# Patient Record
Sex: FEMALE | Race: WHITE
[De-identification: names, ages, dates, MRNs, and addresses within clinical notes are randomized per-mention and may not be internally consistent; named-entity substitution may affect disease eponyms.]

---

## 2018-10-19 ENCOUNTER — HOSPITAL ENCOUNTER (EMERGENCY)
Dept: HOSPITAL 25 - UCEAST | Age: 58
Discharge: HOME | End: 2018-10-19
Payer: COMMERCIAL

## 2018-10-19 VITALS — SYSTOLIC BLOOD PRESSURE: 148 MMHG | DIASTOLIC BLOOD PRESSURE: 98 MMHG

## 2018-10-19 DIAGNOSIS — M79.671: Primary | ICD-10-CM

## 2018-10-19 PROCEDURE — G0463 HOSPITAL OUTPT CLINIC VISIT: HCPCS

## 2018-10-19 PROCEDURE — 99212 OFFICE O/P EST SF 10 MIN: CPT

## 2018-10-19 NOTE — UC
Erythromycin Pregnancy And Lactation Text: This medication is Pregnancy Category B and is considered safe during pregnancy. It is also excreted in breast milk. Lower Extremity/Ankle HPI





- HPI Summary


HPI Summary: 


This patient is a 58 year old F presenting to [facility] with a chief complaint of R 

foot pain since 10/15/18.


Patient reports she tripped over a chair leg. She reports the bruise that is 

there is fading but it is painful to ambulate. The CC is described as not 

improving since onset. The patient rates the pain 8/10 in severity. Symptoms 

aggravated by ambulation. Symptoms alleviated by nothing.





- History of Current Complaint


Chief Complaint: UCLowerExtremity


Stated Complaint: FOOT INJURY


Time Seen by Provider: 10/19/18 19:33


Hx Obtained From: Patient


Hx Last Menstrual Period: 


Onset/Duration: Sudden Onset, Lasting Days, Still Present


Severity Initially: Severe


Severity Currently: Severe


Pain Intensity: 8


Pain Scale Used: 0-10 Numeric


Aggravating Factor(s): Ambulation


Alleviating Factor(s): Nothing


Able to Bear Weight: Yes - able to ambulate but with pain





- Allergies/Home Medications


Allergies/Adverse Reactions: 


 Allergies











Allergy/AdvReac Type Severity Reaction Status Date / Time


 


brown coating on Advil Allergy  Eyes Uncoded 10/19/18 19:31





   Itchy/Swollen/Red/Watery  











Home Medications: 


 Home Medications





NK [No Home Medications Reported]  10/19/18 [History Confirmed 10/19/18]











PMH/Surg Hx/FS Hx/Imm Hx


Endocrine History: Other


Other Endocrine History: No DM


Cancer History: Other


Other Cancer History: No breast CA





- Surgical History


Surgical History: None





- Family History


Known Family History: 


   Negative: Cardiac Disease, Hypertension, Diabetes





- Social History


Alcohol Use: Occasionally


Substance Use Type: None


Smoking Status (MU): Never Smoked Tobacco





Review of Systems


Skin: Bruising


Musculoskeletal: Other: - R foot pain


All Other Systems Reviewed And Are Negative: Yes





Physical Exam





- Summary


Physical Exam Summary: 


VITAL SIGNS: Reviewed.


GENERAL: Patient is a well-developed and nourished FEMALE who is lying 

comfortable in the stretcher. Patient is not in any acute respiratory distress.


HEAD AND FACE: Normocephalic


EYES: PERRLA, EOMI x 2.


EARS: Hearing grossly intact.


MOUTH: Oropharynx within normal limits.


NECK: Supple, trachea is midline, no adenopathy, no JVD, no carotid bruit.


CHEST: Symmetric, no tenderness at palpation


LUNGS: Clear to auscultation bilaterally. No wheezing or crackles.


CVS: Regular rate and rhythm, S1 and S2 present, no murmurs or gallops 

appreciated.


ABDOMEN: Soft, non-tender. Bowel sounds are normal. No abdominal abnormal 

pulsations.


EXTREMITIES: Full ROM in all major joints, no edema, no cyanosis or clubbing. 

Bruising in 2nd toe of the R foot. The patient is ambulating.


NEURO: Alert and oriented x 3. No acute neurological deficits. Speech is normal 

and follows commands.


SKIN: Dry and warm. Bruising in 2nd toe of the R foot.


Triage Information Reviewed: Yes


Vital Signs: 


 Initial Vital Signs











Temp  99.6 F   10/19/18 19:21


 


Pulse  85   10/19/18 19:21


 


Resp  16   10/19/18 19:21


 


BP  148/98   10/19/18 19:21


 


Pulse Ox  99   10/19/18 19:21











Vital Signs Reviewed: Yes





Diagnostics





- Radiology


  ** R foot XR


Radiology Interpretation Completed By: ED Physician - No acute fracture. 

Pending radiologist official interpretation.





Lower Extremity Course/Dx





- Course


Course Of Treatment: X-ray of the right foot impression: No acute fracture 

dislocation.  The patient is ambulating with minimal discomfort.  Patient was 

discharged home with follow-up with PCP.  She was instructed that the x-ray 

will be reviewed by radiology and if there is any changes she will be call 

back.  She will be taking ibuprofen or Tylenol for the pain.  She is 

hemodynamically stable alert oriented 3.





- Differential Dx/Diagnosis


Provider Diagnoses: foot pain





Discharge





- Sign-Out/Discharge


Documenting (check all that apply): Patient Departure


All imaging exams completed and their final reports reviewed: Yes





- Discharge Plan


Condition: Stable


Disposition: HOME


Patient Education Materials:  Arthralgia (ED)


Referrals: 


Nahomy Beach MD [Primary Care Provider] - 


Additional Instructions: 





Take Acetaminophen or ibuprofen for pain or fever


Increase your fluid intake


Return to the  or go to the emergency department if symptoms worsen


Follow-up with primary care physician in next 2-3 days





FOLLOW UP WITH YOUR PRIMARY CARE PROVIDER WITHIN ONE WEEK FOR HIGH BLOOD 

PRESSURE NOTED TODAY.





- Billing Disposition and Condition


Condition: STABLE


Disposition: Home





- Attestation Statements


Document Initiated by Scribe: Yes


Documenting Scribe: Fritz Guzman


Provider For Whom Scribe is Documenting (Include Credential): Dg Lutz MD


Scribe Attestation: 


Fritz MONACO, scribed for Dg Lutz MD on 10/19/18 at 2040. 


Scribe Documentation Reviewed: Yes


Provider Attestation: 


The documentation as recorded by the scribe, Fritz Guzman accurately reflects the 

service I personally performed and the decisions made by me, Dg Lutz MD Doxycycline Pregnancy And Lactation Text: This medication is Pregnancy Category D and not consider safe during pregnancy. It is also excreted in breast milk but is considered safe for shorter treatment courses. Topical Retinoid Pregnancy And Lactation Text: This medication is Pregnancy Category C. It is unknown if this medication is excreted in breast milk. Tetracycline Counseling: Patient counseled regarding possible photosensitivity and increased risk for sunburn. Patient instructed to avoid sunlight, if possible. When exposed to sunlight, patients should wear protective clothing, sunglasses, and sunscreen. The patient was instructed to call the office immediately if the following severe adverse effects occur:  hearing changes, easy bruising/bleeding, severe headache, or vision changes. The patient verbalized understanding of the proper use and possible adverse effects of tetracycline. All of the patient's questions and concerns were addressed. Patient understands to avoid pregnancy while on therapy due to potential birth defects. Topical Retinoid counseling:  Patient advised to apply a pea-sized amount only at bedtime and wait 30 minutes after washing their face before applying. If too drying, patient may add a non-comedogenic moisturizer. The patient verbalized understanding of the proper use and possible adverse effects of retinoids. All of the patient's questions and concerns were addressed. Use Enhanced Medication Counseling?: No High Dose Vitamin A Pregnancy And Lactation Text: High dose vitamin A therapy is contraindicated during pregnancy and breast feeding. Dapsone Counseling: I discussed with the patient the risks of dapsone including but not limited to hemolytic anemia, agranulocytosis, rashes, methemoglobinemia, kidney failure, peripheral neuropathy, headaches, GI upset, and liver toxicity. Patients who start dapsone require monitoring including baseline LFTs and weekly CBCs for the first month, then every month thereafter. The patient verbalized understanding of the proper use and possible adverse effects of dapsone. All of the patient's questions and concerns were addressed. Benzoyl Peroxide Pregnancy And Lactation Text: This medication is Pregnancy Category C. It is unknown if benzoyl peroxide is excreted in breast milk. Spironolactone Counseling: Patient advised regarding risks of diarrhea, abdominal pain, hyperkalemia, birth defects (for female patients), liver toxicity and renal toxicity. The patient may need blood work to monitor liver and kidney function and potassium levels while on therapy. The patient verbalized understanding of the proper use and possible adverse effects of spironolactone. All of the patient's questions and concerns were addressed. Isotretinoin Pregnancy And Lactation Text: This medication is Pregnancy Category X and is considered extremely dangerous during pregnancy. It is unknown if it is excreted in breast milk. Azithromycin Pregnancy And Lactation Text: This medication is considered safe during pregnancy and is also secreted in breast milk. Tazorac Pregnancy And Lactation Text: This medication is not safe during pregnancy. It is unknown if this medication is excreted in breast milk. Birth Control Pills Counseling: Birth Control Pill Counseling: I discussed with the patient the potential side effects of OCPs including but not limited to increased risk of stroke, heart attack, thrombophlebitis, deep venous thrombosis, hepatic adenomas, breast changes, GI upset, headaches, and depression. The patient verbalized understanding of the proper use and possible adverse effects of OCPs. All of the patient's questions and concerns were addressed. Topical Clindamycin Counseling: Patient counseled that this medication may cause skin irritation or allergic reactions. In the event of skin irritation, the patient was advised to reduce the amount of the drug applied or use it less frequently. The patient verbalized understanding of the proper use and possible adverse effects of clindamycin. All of the patient's questions and concerns were addressed. Isotretinoin Counseling: Patient should get monthly blood tests, not donate blood, not drive at night if vision affected, not share medication, and not undergo elective surgery for 6 months after tx completed. Side effects reviewed, pt to contact office should one occur. Topical Sulfur Applications Counseling: Topical Sulfur Counseling: Patient counseled that this medication may cause skin irritation or allergic reactions. In the event of skin irritation, the patient was advised to reduce the amount of the drug applied or use it less frequently. The patient verbalized understanding of the proper use and possible adverse effects of topical sulfur application. All of the patient's questions and concerns were addressed. Tazorac Counseling:  Patient advised that medication is irritating and drying. Patient may need to apply sparingly and wash off after an hour before eventually leaving it on overnight. The patient verbalized understanding of the proper use and possible adverse effects of tazorac. All of the patient's questions and concerns were addressed. Minocycline Pregnancy And Lactation Text: This medication is Pregnancy Category D and not consider safe during pregnancy. It is also excreted in breast milk. Detail Level: Detailed High Dose Vitamin A Counseling: Side effects reviewed, pt to contact office should one occur. Topical Sulfur Applications Pregnancy And Lactation Text: This medication is Pregnancy Category C and has an unknown safety profile during pregnancy. It is unknown if this topical medication is excreted in breast milk. Bactrim Counseling:  I discussed with the patient the risks of sulfa antibiotics including but not limited to GI upset, allergic reaction, drug rash, diarrhea, dizziness, photosensitivity, and yeast infections. Rarely, more serious reactions can occur including but not limited to aplastic anemia, agranulocytosis, methemoglobinemia, blood dyscrasias, liver or kidney failure, lung infiltrates or desquamative/blistering drug rashes. Birth Control Pills Pregnancy And Lactation Text: This medication should be avoided if pregnant and for the first 30 days post-partum. Azithromycin Counseling:  I discussed with the patient the risks of azithromycin including but not limited to GI upset, allergic reaction, drug rash, diarrhea, and yeast infections. Dapsone Pregnancy And Lactation Text: This medication is Pregnancy Category C and is not considered safe during pregnancy or breast feeding. Doxycycline Counseling:  Patient counseled regarding possible photosensitivity and increased risk for sunburn. Patient instructed to avoid sunlight, if possible. When exposed to sunlight, patients should wear protective clothing, sunglasses, and sunscreen. The patient was instructed to call the office immediately if the following severe adverse effects occur:  hearing changes, easy bruising/bleeding, severe headache, or vision changes. The patient verbalized understanding of the proper use and possible adverse effects of doxycycline. All of the patient's questions and concerns were addressed. Spironolactone Pregnancy And Lactation Text: This medication can cause feminization of the male fetus and should be avoided during pregnancy. The active metabolite is also found in breast milk. Bactrim Pregnancy And Lactation Text: This medication is Pregnancy Category D and is known to cause fetal risk. It is also excreted in breast milk. Erythromycin Counseling:  I discussed with the patient the risks of erythromycin including but not limited to GI upset, allergic reaction, drug rash, diarrhea, increase in liver enzymes, and yeast infections. Benzoyl Peroxide Counseling: Patient counseled that medicine may cause skin irritation and bleach clothing. In the event of skin irritation, the patient was advised to reduce the amount of the drug applied or use it less frequently. The patient verbalized understanding of the proper use and possible adverse effects of benzoyl peroxide. All of the patient's questions and concerns were addressed. Topical Clindamycin Pregnancy And Lactation Text: This medication is Pregnancy Category B and is considered safe during pregnancy. It is unknown if it is excreted in breast milk. Minocycline Counseling: Patient advised regarding possible photosensitivity and discoloration of the teeth, skin, lips, tongue and gums. Patient instructed to avoid sunlight, if possible. When exposed to sunlight, patients should wear protective clothing, sunglasses, and sunscreen. The patient was instructed to call the office immediately if the following severe adverse effects occur:  hearing changes, easy bruising/bleeding, severe headache, or vision changes. The patient verbalized understanding of the proper use and possible adverse effects of minocycline. All of the patient's questions and concerns were addressed.

## 2018-10-20 NOTE — RAD
HISTORY: foot pain, trauma



COMPARISONS: None



VIEWS: 3 , Frontal, lateral, and oblique views of the right foot 



FINDINGS:



BONE DENSITY: Normal.

BONES: There is no displaced fracture. There are calcaneal enthesophytes. 

JOINTS: There is moderate to advanced osteoarthritis of the first MTP joint and midfoot. 

ALIGNMENT: There is hallux valgus. 

SOFT TISSUES: Unremarkable.



OTHER FINDINGS: None.



IMPRESSION: 

OSTEOARTHRITIS. NO ACUTE OSSEOUS INJURY. IF SYMPTOMS PERSIST, RECOMMEND REPEAT IMAGING.



R0

## 2019-04-16 NOTE — HP
HISTORY AND PHYSICAL:

 

DATE OF ADMISSION/SURGERY:  04/23/19

 

DATE OF OFFICE VISIT:  04/15/19

 

SURGEON:  Judy Spencer MD* (dictated by JEAN Marshall).

 

PROCEDURE:  Right total knee arthroplasty.

 

CHIEF COMPLAINT:  Right knee pain.

 

HISTORY OF PRESENT ILLNESS:  Ms. Guzman is a 58-year-old female with end-stage 
osteoarthritis of the right knee.  She has failed conservative treatment and 
elected to proceed with a right total knee arthroplasty.

 

PAST MEDICAL HISTORY:  Denies.

 

PAST SURGICAL HISTORY:  Denies.

 

MEDICATIONS:  Vitamin D3.

 

ALLERGIES:  No known drug allergies.

 

FAMILY HISTORY:  Hypertension.

 

SOCIAL HISTORY:  A 58-year-old, lives with her spouse.  Does not smoke or use 
drugs or alcohol.

 

REVIEW OF SYSTEMS:  A complete 14-point review of systems was reviewed with the 
patient.  It was all negative and noncontributory.  She denies a history of DVT
, PE, hepatitis, HIV, or anesthesia problems.

 

                               PHYSICAL EXAMINATION

 

GENERAL:  She is well developed, well nourished, in no acute distress.

 

VITAL SIGNS:  She stands 64 inches tall, weighs 183 pounds. Blood pressure is 
118/78, heart rate is 68.

 

HEENT:  Normocephalic, atraumatic.

 

NECK:  Supple, no palpable lymph nodes.

 

PULMONARY:  Lungs are clear to auscultation bilaterally.

 

CARDIAC:  Regular rate and rhythm.  Strong S1, S2.

 

ABDOMEN:  Soft, nontender, nondistended.

 

NEUROLOGICAL:  She is alert and oriented x3.

 

MUSCULOSKELETAL:  Right lower extremity, the skin is intact.  There are no open 
wounds or abrasions.  Some tenderness over the medial and lateral joint lines. 
There is some moderate joint effusion.  Range of motion is 15 to 120 degrees of 
flexion with patellofemoral crepitus.  She has 2+ dorsalis pedis pulse.  She is 
able to dorsiflex and plantarflex and has intact sensation.

 

 ASSESSMENT AND PLAN:  Ms. Guzman is a 58-year-old female with end-stage 
osteoarthritis of the right knee.  She has failed conservative treatment and 
elected to proceed with a right total knee arthroplasty.  The surgery is 
scheduled for 04/23/19 with Dr. Spencer.  Dr. Spencer discussed the risks and 
benefits of the surgery at today's visit and all of her questions were 
answered.  She will follow up with Dr. Spencer 2 weeks after the surgery.

 

 ____________________________________ 

JEAN MARSHALL

 

[id number]/CPS #: [id number]

[facility]

## 2019-04-23 ENCOUNTER — HOSPITAL ENCOUNTER (OUTPATIENT)
Dept: HOSPITAL 25 - OR | Age: 59
Setting detail: OBSERVATION
LOS: 1 days | Discharge: HOME | End: 2019-04-24
Attending: ORTHOPAEDIC SURGERY | Admitting: ORTHOPAEDIC SURGERY
Payer: COMMERCIAL

## 2019-04-23 DIAGNOSIS — M17.11: Primary | ICD-10-CM

## 2019-04-23 PROCEDURE — 85049 AUTOMATED PLATELET COUNT: CPT

## 2019-04-23 PROCEDURE — 85018 HEMOGLOBIN: CPT

## 2019-04-23 PROCEDURE — 80048 BASIC METABOLIC PNL TOTAL CA: CPT

## 2019-04-23 PROCEDURE — 88305 TISSUE EXAM BY PATHOLOGIST: CPT

## 2019-04-23 PROCEDURE — 88311 DECALCIFY TISSUE: CPT

## 2019-04-23 PROCEDURE — G0378 HOSPITAL OBSERVATION PER HR: HCPCS

## 2019-04-23 PROCEDURE — C1776 JOINT DEVICE (IMPLANTABLE): HCPCS

## 2019-04-23 PROCEDURE — 96375 TX/PRO/DX INJ NEW DRUG ADDON: CPT

## 2019-04-23 PROCEDURE — 85014 HEMATOCRIT: CPT

## 2019-04-23 PROCEDURE — 96374 THER/PROPH/DIAG INJ IV PUSH: CPT

## 2019-04-23 PROCEDURE — 36415 COLL VENOUS BLD VENIPUNCTURE: CPT

## 2019-04-23 RX ADMIN — MAGNESIUM HYDROXIDE SCH ML: 400 SUSPENSION ORAL at 21:05

## 2019-04-23 RX ADMIN — SODIUM CHLORIDE, SODIUM LACTATE, POTASSIUM CHLORIDE, AND CALCIUM CHLORIDE SCH MLS/HR: 600; 310; 30; 20 INJECTION, SOLUTION INTRAVENOUS at 14:41

## 2019-04-23 RX ADMIN — OXYCODONE HYDROCHLORIDE PRN MG: 5 CAPSULE ORAL at 18:12

## 2019-04-23 RX ADMIN — ACETAMINOPHEN SCH: 325 TABLET ORAL at 22:52

## 2019-04-23 RX ADMIN — CYCLOBENZAPRINE HYDROCHLORIDE PRN MG: 10 TABLET, FILM COATED ORAL at 21:00

## 2019-04-23 RX ADMIN — DOCUSATE SODIUM SCH MG: 100 CAPSULE, LIQUID FILLED ORAL at 21:04

## 2019-04-23 RX ADMIN — OXYCODONE HYDROCHLORIDE AND ACETAMINOPHEN PRN TAB: 5; 325 TABLET ORAL at 20:59

## 2019-04-23 RX ADMIN — DOCUSATE SODIUM SCH: 100 CAPSULE, LIQUID FILLED ORAL at 21:07

## 2019-04-23 RX ADMIN — CEFAZOLIN SCH MLS/HR: 1 INJECTION, POWDER, FOR SOLUTION INTRAVENOUS at 17:25

## 2019-04-23 RX ADMIN — OXYCODONE HYDROCHLORIDE PRN MG: 5 CAPSULE ORAL at 14:06

## 2019-04-23 NOTE — PN
Progress Note





- Progress Note


Date of Service: 04/23/19


Note: 





patient resting comfortably with no complaints; able to wiggle toes/plantar flex

, 2_+ DP pulse and intact sensation, dressing c/d/i

## 2019-04-23 NOTE — OP
Operative Report - Blank





- Operative Report


Date of Operation: 04/23/19


Note: 





DENNIS HOLLINGSWORTH


1960





Date of Surgery: 4/23/19





Judy Spencer MD





Assistant: Megan PENA did help throughout the procedure with preparation 

of the knee, wound retraction, manipulation of the knee, and wound closure.  





Anesthesiologist: Peña CHI





Anesthesia Type: Spinal





Preoperative Diagnosis: Right severe degenerative osteoarthritis of the knee





Postoperative Diagnosis: As above





Procedure Performed: Right Total Knee Arthroplasty





Tourniquet time: 44 minutes





Complications: None





Specimen: Bone and cartilage from the right knee joint sent to pathology.  





Hardware Used: Cemented [employer]hew total knee hardware was used - For the 

femur a size 6 narrow oxinium legion posterior stabilized femoral component, 

for the tibia a size 5 right larissa II tibial baseplate, for the insert a size 

9 mm 5-6 posterior stabilized articular polyethylene insert, and for the 

patella a size 32 3-peg all poly patella.  





Brief History/Indication: DENNIS HOLLINGSWORTH was known in clinic and had a history of 

severe right knee pain and swelling. She failed conservative treatment with anti

-inflammatories, pain pills, intra-articular injections and physical therapy.  

She elected to undergo right total knee arthroplasty due to continued pain and 

decreased quality of life.  Radiographs showed severe end stage osteoarthritis 

of the knee with bone on bone contact.  Informed consent was obtained from the 

patient.  She understood the risks of surgery included but were not limited to: 

bleeding, infection, damage to nearby structures, intraoperative fracture, 

nerve palsy, failure of the hardware, early loosening, knee stiffness or loss 

of motion, anesthesia complications, stroke, heart attack, blood clot and 

death.  She wished to proceed.





Intra-Operative Findings: Intraoperatively the patient was noted to have severe 

loss of cartilage in all 3 compartments of the knee.  





Description of the Procedure: 





DENNIS HOLLINGSWORTH was identified in the preanesthesia unit. Her right knee was marked 

as the correct operative side.  Informed consent was signed and placed in the 

chart. The patient was taken to the operating room and placed under anesthesia 

without complication. A aguirre catheter was placed. A tourniquet was placed on 

the right thigh. The right lower extremity was prepped and draped in the usual 

sterile fashion. Preoperative time-out was made to correctly identify the 

patient, side and site. Appropriate intraoperative antibiotics were given 

within one hour of incision.





Tourniquet was inflated. A midline incision was made and carried sharply down 

to the extensor mechanism. A new 10 blade was used to make a standard medial 

parapatellar arthrotomy. The patella was subluxed laterally. Electrocautery was 

used to dissect soft tissue off the superomedial tibia to the midsagittal 

plane.  The knee was flexed up. The anterior horn of the lateral meniscus and 

the ACL were sharply incised. A drill was used to enter the distal femur. The 

intramedullary distal femoral cutting guide was pinned on the distal femur. The 

oscillating saw was used to make the distal femoral cut.





The external rotation guide was pinned on the distal femur and the distal femur 

was sized to a size 6. The size 6 multi-cutting jig was pinned on the distal 

femur. The oscillating saw was used to make the appropriate 4 chamfer cuts.  

Next the PCL was completely released.  The extramedullary tibial cutting guide 

was pinned on the proximal tibia and the oscillating saw was used to make the 

proximal tibial cut perpendicular to the mechanical axis of the tibia. The bone 

was carefully removed.





The knee was brought out into full extension. The spacer block was placed and 

had excellent fit with the knee in full extension. The medial and lateral 

ligaments were well balanced. The flexion and extension gaps were well 

balanced. The knee was flexed up. Lamina  was placed both medially and 

laterally. Any remaining meniscus was removed with electrocautery.  Curved 

osteotome was used to remove any posterior osteophytes. The tibial tray and 

drop radha were placed and confirmed a satisfactory tibial cut.





The size 6 right narrow femoral trial was impacted onto the distal femur. This 

trial had excellent fit and stability. The box for the posterior stabilized 

implant was prepared using a box cut osteotome and a reamer. Next a tibial tray 

trial and 9 mm insert trial was placed. The knee was taken through a range of 

motion and had full extension to 130 degrees of flexion. Patellofemoral 

tracking was satisfactory.





The patella was inverted and sized to a size 32. Three peg holes were drilled 

through the size 32 drill guide. The trial patella was placed and the knee was 

taken through a range of motion. There was satisfactory patellofemoral 

tracking. All trials were removed. The tibia was subluxed anteriorly and sized 

to a size 5. The proximal tibial was prepared with a size 5 keel punch.  All 

bony cut surfaces were irrigated with sterile saline and dried. Final implants 

were cemented into place starting with the tibia, followed by the femur, and 

last the patella. A 9 mm insert trial was placed and the knee was brought into 

full extension.





Tourniquet was turned down and the knee was copiously irrigated with sterile 

saline. Electrocautery was used to obtain meticulous hemostasis. Once the 

cement had fully cured, the insert trial was removed. Any excess cement was 

removed from around the hardware and capsule.  Final insert chosen was a 9 mm 

posterior stabilized Larissa II articular insert size 5-6. Stability of the 

insert was checked and noted to be stable.





The extensor mechanism was closed using number 1 vicryls. The rest of the 

incision was closed in a layered fashion using 0 and 2-0 vicryls. The skin was 

closed using 3-0 nylon suture. Sterile xeroform, 4x4s and webril were used to 

cover the incision.  Ace wrap and cold pack were used to cover the dressings. 

The patients anesthesia was reversed without difficulty. She was taken to the 

PACU in stable condition.  Intended weight-bearing will be as tolerated.

## 2019-04-24 VITALS — SYSTOLIC BLOOD PRESSURE: 153 MMHG | DIASTOLIC BLOOD PRESSURE: 78 MMHG

## 2019-04-24 LAB
ANION GAP SERPL CALC-SCNC: 4 MMOL/L (ref 2–11)
BUN SERPL-MCNC: 8 MG/DL (ref 6–24)
BUN/CREAT SERPL: 12.1 (ref 8–20)
CALCIUM SERPL-MCNC: 9 MG/DL (ref 8.6–10.3)
CHLORIDE SERPL-SCNC: 104 MMOL/L (ref 101–111)
GLUCOSE SERPL-MCNC: 135 MG/DL (ref 70–100)
HCO3 SERPL-SCNC: 30 MMOL/L (ref 22–32)
HCT VFR BLD AUTO: 40 % (ref 33–41)
HGB BLD-MCNC: 13.7 G/DL (ref 12–16)
PLATELET # BLD AUTO: 186 10^3/UL (ref 150–450)
POTASSIUM SERPL-SCNC: 3.8 MMOL/L (ref 3.5–5)
SODIUM SERPL-SCNC: 138 MMOL/L (ref 135–145)

## 2019-04-24 RX ADMIN — ACETAMINOPHEN SCH: 325 TABLET ORAL at 06:36

## 2019-04-24 RX ADMIN — OXYCODONE HYDROCHLORIDE PRN MG: 5 CAPSULE ORAL at 14:49

## 2019-04-24 RX ADMIN — OXYCODONE HYDROCHLORIDE AND ACETAMINOPHEN PRN TAB: 5; 325 TABLET ORAL at 04:08

## 2019-04-24 RX ADMIN — OXYCODONE HYDROCHLORIDE PRN MG: 5 CAPSULE ORAL at 00:24

## 2019-04-24 RX ADMIN — ACETAMINOPHEN SCH: 325 TABLET ORAL at 13:25

## 2019-04-24 RX ADMIN — DOCUSATE SODIUM SCH: 100 CAPSULE, LIQUID FILLED ORAL at 08:42

## 2019-04-24 RX ADMIN — OXYCODONE HYDROCHLORIDE AND ACETAMINOPHEN PRN TAB: 5; 325 TABLET ORAL at 11:23

## 2019-04-24 RX ADMIN — OXYCODONE HYDROCHLORIDE PRN MG: 5 CAPSULE ORAL at 07:27

## 2019-04-24 RX ADMIN — SODIUM CHLORIDE, SODIUM LACTATE, POTASSIUM CHLORIDE, AND CALCIUM CHLORIDE SCH MLS/HR: 600; 310; 30; 20 INJECTION, SOLUTION INTRAVENOUS at 00:25

## 2019-04-24 RX ADMIN — MAGNESIUM HYDROXIDE SCH ML: 400 SUSPENSION ORAL at 08:40

## 2019-04-24 RX ADMIN — CEFAZOLIN SCH MLS/HR: 1 INJECTION, POWDER, FOR SOLUTION INTRAVENOUS at 00:28

## 2019-04-24 RX ADMIN — CEFAZOLIN SCH MLS/HR: 1 INJECTION, POWDER, FOR SOLUTION INTRAVENOUS at 07:31

## 2019-04-24 RX ADMIN — CYCLOBENZAPRINE HYDROCHLORIDE PRN MG: 10 TABLET, FILM COATED ORAL at 04:09

## 2019-04-24 NOTE — PN
Progress Note





- Progress Note


Date of Service: 04/24/19


SOAP: 


Subjective:


[]Pt seen and examined at bedside. She feels very well and desires DC home. 

Denies CP, SOB, dizziness, nausea.








Objective:


[]Gereral: Well appearing, NAD


RLE: Right knee dressing changed, incision CDI without erythema or discharge, 

thigh is soft, DP2+, DF/PF intact


Calves supple and nontender without erythema, edema or palpable cords 








Assessment:


[]POD 1 sp RTK








Plan:


[]WBAT


PT/OT


eliquis 2.5 mg po BID for 30 days post op


Anticipate DC home today as long as patient meets goals with PT





 Vital Signs











Temp  98.9 F   04/24/19 07:20


 


Pulse  76   04/24/19 07:20


 


Resp  18   04/24/19 08:00


 


BP  117/73   04/24/19 07:20


 


Pulse Ox  98   04/24/19 08:00








 Intake & Output











 04/23/19 04/24/19 04/24/19





 18:59 06:59 18:59


 


Intake Total 2655 2010 320


 


Output Total 2000 2100 


 


Balance 655 -90 320


 


Intake:   


 


  IV Fluids 2200 1060 


 


    ABX - CEFAZOLIN  110 


 


    LR 2100 950 


 


    NS 100ML, Cefazolin 2G 100  


 


  IVPB 55  


 


    ABX - CEFAZOLIN 55  


 


  Oral 400 950 320


 


Output:   


 


  Paz 2000 2100 


 


Other:   


 


  # Bowel Movements  0 








 Laboratory Last Values











Hgb  13.7 g/dL (12.0-16.0)   04/24/19  06:27    


 


Hct  40 % (33-41)   04/24/19  06:27    


 


Plt Count  186 10^3/uL (150-450)   04/24/19  06:27    


 


MPV  8.4 fL (7.4-10.4)   04/24/19  06:27    


 


Sodium  138 mmol/L (135-145)   04/24/19  06:27    


 


Potassium  3.8 mmol/L (3.5-5.0)   04/24/19  06:27    


 


Chloride  104 mmol/L (101-111)   04/24/19  06:27    


 


Carbon Dioxide  30 mmol/L (22-32)   04/24/19  06:27    


 


Anion Gap  4 mmol/L (2-11)   04/24/19  06:27    


 


BUN  8 mg/dL (6-24)   04/24/19  06:27    


 


Creatinine  0.66 mg/dL (0.51-0.95)   04/24/19  06:27    


 


Est GFR ( Amer)  112.1  (>60)   04/24/19  06:27    


 


Est GFR (Non-Af Amer)  92.6  (>60)   04/24/19  06:27    


 


BUN/Creatinine Ratio  12.1  (8-20)   04/24/19  06:27    


 


Glucose  135 mg/dL ()  H  04/24/19  06:27    


 


Calcium  9.0 mg/dL (8.6-10.3)   04/24/19  06:27

## 2019-04-24 NOTE — DS
Orthopedic Discharge Summary





- Discharge Summary





Date of Admission:19


Date of Discharge: 19


Date of Surgery: 19


Attending Orthopedic Provider: Dr Spencer


Pre-operative Diagnosis: right knee osteoarthritis


Operative Procedure: right total knee arthroplasty


Condition of Patient: stable


History: DENNIS HOLLINGSWORTH is a 58 year old F with years of increasingly severe right 

knee pain. Patient has failed conservative management and has elected to 

undergo a right total knee replacement


Hospital Course: DENNIS was admitted to [facility] on 19. 

Patient underwent a right total knee replacement without complication followed 

by a brief recovery in PACU and transfer to the Short Stay Surgical Unit in 

stable condition. Our hospitalist service, physical therapy and occupational 

therapy also participated in this patients care. Post-op day 1: patient was 

alert and in no acute distress. Dressing and incision was clean, dry and 

intact. Operative extremity dorsiflexion and plantarflexion intact, sensation 

intact to light touch distally, DP2+. Physical therapy goals were met. Stable 

for DC home.





 Discharge Medications











 Medication  Instructions  Recorded  Confirmed  Type


 


Acetaminophen TAB* [Tylenol TAB*] 975 mg PO Q8HR  tab 19  Rx


 


Apixaban* [Eliquis*] 2.5 mg PO BID #60 tab 19  Rx


 


Docusate CAP* [Colace Cap*] 100 mg PO BID #90 cap 19  Rx


 


oxyCODONE/Acetamin 5/325 MG* 2 tab PO Q4H PRN #70 tab MDD 10 19  Rx





[Percocet 5/325 TAB*]    








Discharge to home in stable condition 19





Discharge Instructions following Orthopedic Surgery:





Activity:  


* Weight Bearing as tolerated


* Continue physical therapy and occupational therapy exercises as shown


* start outpatient PT





Wound care: 


* OK to shower on post-op day 3, no bathing, swimming, or submerging wound. 


* Use gentle soap, pat dry. Cover with gauze, ACE wrap or tape.





Call Orthopedic office for: 


* Increased drainage


* Redness


* Increased pain


* Fever 





***Go to ER with shortness of breath or chest pain.***





Diet: 


* Regular diet


* Increase fluids and fiber to prevent constipation. 


* Continue to use stool softeners, call office if no bowel motion within 48 

hours.








Medications


See Home Medication List in your packet for medications that you should take 

after discharge.





DVT Prophylaxis:





   Eliquis Dosin.5 mg, 1 tab every 12 hours x 30 days. This medication 

increases bleeding tendency








Pain Control:





   Percocet Dosin/325 mg 1-2 tabs by mouth every 4-6 hours as needed for 

pain. Maximum of 10 tabs per day.  Wean off as soon as pain allows. hold for 

sedation





**Please note that Percocet contains Tylenol (acetaminophen). Maximum daily 

dose of Tylenol is 4000 mg from all sources.**





Antibiotics are required prior to any dental work.








FOLLOW UP: Follow up with  [Tj] Within 10-14 days, call for appointment





Please call our office with any questions or concerns ([phone])

## 2020-04-20 ENCOUNTER — HOSPITAL ENCOUNTER (OUTPATIENT)
Dept: HOSPITAL 25 - ED | Age: 60
Setting detail: OBSERVATION
LOS: 1 days | Discharge: HOME | End: 2020-04-21
Attending: SURGERY | Admitting: SURGERY
Payer: COMMERCIAL

## 2020-04-20 ENCOUNTER — HOSPITAL ENCOUNTER (EMERGENCY)
Dept: HOSPITAL 25 - UCEAST | Age: 60
Discharge: HOME HEALTH | End: 2020-04-20
Payer: COMMERCIAL

## 2020-04-20 VITALS — SYSTOLIC BLOOD PRESSURE: 150 MMHG | DIASTOLIC BLOOD PRESSURE: 88 MMHG

## 2020-04-20 DIAGNOSIS — F32.9: ICD-10-CM

## 2020-04-20 DIAGNOSIS — Z88.6: ICD-10-CM

## 2020-04-20 DIAGNOSIS — R11.0: ICD-10-CM

## 2020-04-20 DIAGNOSIS — R14.0: ICD-10-CM

## 2020-04-20 DIAGNOSIS — K35.80: Primary | ICD-10-CM

## 2020-04-20 DIAGNOSIS — Z88.8: ICD-10-CM

## 2020-04-20 DIAGNOSIS — Z96.651: ICD-10-CM

## 2020-04-20 DIAGNOSIS — Z79.899: ICD-10-CM

## 2020-04-20 DIAGNOSIS — R10.31: Primary | ICD-10-CM

## 2020-04-20 LAB
ALBUMIN SERPL BCG-MCNC: 4.1 G/DL (ref 3.2–5.2)
ALBUMIN/GLOB SERPL: 1.4 {RATIO} (ref 1–3)
ALP SERPL-CCNC: 82 U/L (ref 34–104)
ALT SERPL W P-5'-P-CCNC: 20 U/L (ref 7–52)
ANION GAP SERPL CALC-SCNC: 6 MMOL/L (ref 2–11)
AST SERPL-CCNC: 13 U/L (ref 13–39)
BASOPHILS # BLD AUTO: 0 10^3/UL (ref 0–0.2)
BUN SERPL-MCNC: 9 MG/DL (ref 6–24)
BUN/CREAT SERPL: 14.3 (ref 8–20)
CALCIUM SERPL-MCNC: 8.8 MG/DL (ref 8.6–10.3)
CHLORIDE SERPL-SCNC: 105 MMOL/L (ref 101–111)
EOSINOPHIL # BLD AUTO: 0 10^3/UL (ref 0–0.6)
GLOBULIN SER CALC-MCNC: 2.9 G/DL (ref 2–4)
GLUCOSE SERPL-MCNC: 126 MG/DL (ref 70–100)
HCO3 SERPL-SCNC: 26 MMOL/L (ref 22–32)
HCT VFR BLD AUTO: 43 % (ref 35–47)
HGB BLD-MCNC: 14.6 G/DL (ref 12–16)
LYMPHOCYTES # BLD AUTO: 0.6 10^3/UL (ref 1–4.8)
MCH RBC QN AUTO: 31 PG (ref 27–31)
MCHC RBC AUTO-ENTMCNC: 34 G/DL (ref 31–36)
MCV RBC AUTO: 91 FL (ref 80–97)
MONOCYTES # BLD AUTO: 0.3 10^3/UL (ref 0–0.8)
NEUTROPHILS # BLD AUTO: 11.1 10^3/UL (ref 1.5–7.7)
NRBC # BLD AUTO: 0 10^3/UL
NRBC BLD QL AUTO: 0
PLATELET # BLD AUTO: 218 10^3/UL (ref 150–450)
POTASSIUM SERPL-SCNC: 3.4 MMOL/L (ref 3.5–5)
PROT SERPL-MCNC: 7 G/DL (ref 6.4–8.9)
RBC # BLD AUTO: 4.69 10^6 /UL (ref 3.7–4.87)
SODIUM SERPL-SCNC: 137 MMOL/L (ref 135–145)
WBC # BLD AUTO: 12 10^3/UL (ref 3.5–10.8)

## 2020-04-20 PROCEDURE — C1776 JOINT DEVICE (IMPLANTABLE): HCPCS

## 2020-04-20 PROCEDURE — 99284 EMERGENCY DEPT VISIT MOD MDM: CPT

## 2020-04-20 PROCEDURE — 96365 THER/PROPH/DIAG IV INF INIT: CPT

## 2020-04-20 PROCEDURE — 96366 THER/PROPH/DIAG IV INF ADDON: CPT

## 2020-04-20 PROCEDURE — 96375 TX/PRO/DX INJ NEW DRUG ADDON: CPT

## 2020-04-20 PROCEDURE — G0463 HOSPITAL OUTPT CLINIC VISIT: HCPCS

## 2020-04-20 PROCEDURE — 74177 CT ABD & PELVIS W/CONTRAST: CPT

## 2020-04-20 PROCEDURE — 99211 OFF/OP EST MAY X REQ PHY/QHP: CPT

## 2020-04-20 PROCEDURE — 81003 URINALYSIS AUTO W/O SCOPE: CPT

## 2020-04-20 PROCEDURE — 85025 COMPLETE CBC W/AUTO DIFF WBC: CPT

## 2020-04-20 PROCEDURE — 87086 URINE CULTURE/COLONY COUNT: CPT

## 2020-04-20 PROCEDURE — 80053 COMPREHEN METABOLIC PANEL: CPT

## 2020-04-20 PROCEDURE — 76830 TRANSVAGINAL US NON-OB: CPT

## 2020-04-20 PROCEDURE — 36415 COLL VENOUS BLD VENIPUNCTURE: CPT

## 2020-04-20 PROCEDURE — 88304 TISSUE EXAM BY PATHOLOGIST: CPT

## 2020-04-20 PROCEDURE — G0378 HOSPITAL OBSERVATION PER HR: HCPCS

## 2020-04-20 PROCEDURE — 87077 CULTURE AEROBIC IDENTIFY: CPT

## 2020-04-20 PROCEDURE — 86140 C-REACTIVE PROTEIN: CPT

## 2020-04-20 PROCEDURE — 83690 ASSAY OF LIPASE: CPT

## 2020-04-20 NOTE — XMS REPORT
Continuity of Care Document (CCD)

 Created on:2020



Patient:Isabella Guzman

Sex:Female

:1960

External Reference #:[id number]





Demographics







 Address  [address] [place] [address]

 

 Mobile Phone  [phone]

 

 Email Address  [email or web address]

 

 Preferred Language  en

 

 Marital Status  Not  or 

 

 Jew Affiliation  Unknown

 

 Race  White

 

 Ethnic Group  Not  or 









Author







 Name  Judy Spencer M.D.

 

 Address  [address] [place] [address] [address]



   [place] [address]









Care Team Providers







 Name  Role  Phone

 

 Nahomy Espinosa MD - Family Medicine  Care Team Information   [phone]
[phone]









Problems







 Active Problems  Provider  Date

 

 Localized, primary osteoarthritis  Judy Spencer M.D.  Onset: 2019

 

 Acquired genu valgum  Judy Spencer M.D.  Onset: 2019

 

 History of total knee arthroplasty    Onset: 2019

 

 Postmenopausal bleeding    Onset: 2018









 Note: LMP 12/15, 4 days of VB , had negative endometrial biopsy Dr Burkett 
.



 No bleeding since.









 Genetic counseling    Onset: 2018

 

 Gluten sensitivity    Onset: 2018

 

 Obesity    Onset: 2017

 

 Metabolic syndrome X    Onset: 2017

 

 Seasonal affective disorder    Onset: 2017

 

 Hereditary essential tremor    Onset: 2017

 

 Vitamin D deficiency    Onset: 2016

 

 Hyperlipidemia    Onset: 2016

 

 Osteoarthritis of knee    Onset: 2016

 

 Generalized anxiety disorder    Onset: 2013

 

 Perimenopausal disorder    Onset: 2011

 

 Postpartum depression    Onset: 2001

 

 Closed fracture of patella  Judy Spencer M.D.  Onset: 2020







Social History







 Type  Date  Description  Comments

 

 Birth Sex    Unknown  

 

 Tobacco Use  Start: Unknown  Never Smoked Cigarettes  

 

 Smoking Status  Reviewed: 20  Never Smoked Cigarettes  

 

 ETOH Use    Rarely consumes alcohol  

 

 Tobacco Use  Start: Unknown  Patient has never smoked  

 

 Exercise Type/Frequency    Exercises regularly  

 

 Guns in Home    No  







Allergies, Adverse Reactions, Alerts







 Description

 

 No Known Drug Allergies







Medications







 Active Medications  SIG  Qnty  Indications  Ordering Provider  Date

 

 Amoxicillin  take 4 pills, 2 g  4caps  Z47.1  Judy Spencer,  2019



           500mg  1 hour before      M.D.  



 Capsules  dental or gi        



   procedure        

 

 Vitamin D3  one every daily      Unknown  



          5000Unit          



 Capsules          



           

 

 Tylenol  2 by mouth every 6      Unknown  



       325mg Tablets  hours as needed        



   pain        

 

 Aleve  as directed      Unknown  



     220mg Tablets          



           

 

 Naproxen Sodium  prn for joint      Unknown  



               220mg  pain, takes it        



 Tablets  rarely        



           







Medications Administered in Office







 Medication  SIG  Qnty  Indications  Ordering Provider  Date

 

 DTaP,Unspecified        Unknown  10/01/2006



   Injection          







Immunizations







 CPT Code  Status  Date  Vaccine  Lot #

 

 [id number]  Given  10/01/2019  Influenza Virus Vaccine, Quadrivalent, Split,  



       Preservative Free  

 

 [id number]  Given  2017  Influenza Virus Vaccine, Quadrivalent, Split, Im Use
  



       6-35mo  

 

 [id number]  Given  2017  Tdap - Tetanus/Diptheria/Acellular Pertussis  

 

 [id number]  Given  10/01/2015  Flu Vaccine  







Vital Signs







 Date  Vital  Result  Comment

 

 2020 10:26am  Height  64 inches  5'4"









 Weight  195.00 lb  

 

 Heart Rate  87 /min  

 

 BP Systolic  150 mmHg  

 

 BP Diastolic  85 mmHg  

 

 Body Temperature  98.3 F  

 

 O2 % BldC Oximetry  98 %  

 

 BMI (Body Mass Index)  33.5 kg/m2  









 2020  9:54am  Height  64 inches  5'4"









 Weight  190.00 lb  

 

 Heart Rate  120 /min  

 

 BP Systolic  120 mmHg  

 

 BP Diastolic  81 mmHg  

 

 Body Temperature  97.9 F  

 

 Pain Level  0  

 

 BMI (Body Mass Index)  32.6 kg/m2  







Results







 Test  Acquired Date  Facility  Test  Result  H/L  Range  Note

 

 Basic Metabolic  2020  [facility]  Sodium  140 mmol/L  Normal
  135-145  1



 Panel    [address] [place] [address] [phone]          









 Potassium  4.1 mmol/L  Normal  3.5-5.0  

 

 Chloride  106 mmol/L  Normal  101-111  

 

 Co2 Carbon Dioxide  29 mmol/L  Normal  22-32  

 

 Anion Gap  5 mmol/L  Normal  2-11  

 

 Glucose  106 mg/dL  High    

 

 Blood Urea Nitrogen  11 mg/dL  Normal  6-24  

 

 Creatinine  0.69 mg/dL  Normal  0.51-0.95  

 

 BUN/Creatinine Ratio  15.9  Normal  8-20  

 

 Calcium  8.9 mg/dL  Normal  8.6-10.3  

 

 Egfr Non-  87.7    >60  

 

 Egfr   106.1    >60  2









 Lipid Profile  2020  [facility]  Triglycerides  134 mg/dL    
  3



 (Trig/Chol/HDL)    [address]          



     [place] [address] [phone]          









 Cholesterol  138 mg/dL      4

 

 HDL Cholesterol  38.0 mg/dL      5

 

 LDL Cholesterol  73 mg/dL      6









 Laboratory test  2020  [facility]  Hemoglobin A1c  5.7 %  
High  4.0-5.6  7



 finding    [address]  (Glyco HGB)        



     [place] [address] [phone]          









 1  FASTING

 

 2  *******Because ethnic data is not always readily available,



   this report includes an eGFR for both -Americans and



   non- Americans.****



   The National Kidney Disease Education Program (NKDEP) does



   not endorse the use of the MDRD equation for patients that



   are not between the ages of 18 and 70, are pregnant, have



   extremes of body size, muscle mass, or nutritional status,



   or are non- or non-.



   According to the National Kidney Foundation, irrespective of



   diagnosis, the stage of the disease is based on the level of



   kidney function:



   Stage Description                      GFR(mL/min/1.73 m(2))



   1     Kidney damage with normal or decreased GFR       90



   2     Kidney damage with mild decrease in GFR          60-89



   3     Moderate decrease in GFR                         30-59



   4     Severe decrease in GFR                           15-29



   5     Kidney failure                       <15 (or dialysis)

 

 3  Desirable: <150



   Borderline High: 150-199



   High: 200-499



   Very High: >500

 

 4  Desirable: <200



   Borderline High: 200-239



   High: >239

 

 5  Low: <40



   Desirable: 40-60



   High: >60

 

 6  Desirable: <100



   Near Optimal: 100-129



   Borderline High: 130-159



   High: 160-189



   Very High: >189

 

 7  Therapeutic target for the treatment of diabetes



   mellitus patients is <7% HBA1C, and in selective



   patients <6.0%.  Please refer to American Diabetes



   Association diabetic care guidelines for further



   information.







Procedures







 Date  Code  Description  Status

 

 2019  95443484  Mammogram  Completed

 

 2013  36505984  Colonoscopy  Completed







Medical Devices







 Description

 

 No Information Available







Encounters







 Type  Date  Location  Provider  Dx  Diagnosis

 

 Office Visit  2020  [facility]  Judy Spencer,  M25.562  Pain in 
left knee



   8:15a  at [place]  M.D.    









 S82.002D  Unsp fracture of left patella, subs for clos fx w routn heal

 

 W01.0xxD  Fall same lev from slip/trip w/o strike against object, subs

 

 M25.462  Effusion, left knee

 

 M17.12  Unilateral primary osteoarthritis, left knee









 Office Visit  2020 10:30a  [facility] [place]  Nahomy Espinosa MD  Z00.00  
Encntr for



     [facility]      general adult



           medical exam w/o



           abnormal



           findings









 M17.12  Unilateral primary osteoarthritis, left knee

 

 Z96.651  Presence of right artificial knee joint

 

 R73.01  Impaired fasting glucose

 

 F32.9  Major depressive disorder, single episode, unspecified









 Office Visit  2020  9:45a  [facility]  Judy Spencer,  S82.002D  
Unsp fracture



     at [place]  M.D.    of left



           patella, subs



           for clos fx w



           routn heal









 W01.0xxD  Fall same lev from slip/trip w/o strike against object, subs

 

 M25.562  Pain in left knee

 

 M25.462  Effusion, left knee

 

 M17.12  Unilateral primary osteoarthritis, left knee

 

 S80.02xD  Contusion of left knee, subsequent encounter









 Office Visit  2019  8:15a  [facility]  Judy Spencer,  M25.562  
Pain in left



     at [place]  M.D.    knee









 M25.462  Effusion, left knee

 

 M25.562  Pain in left knee

 

 S80.02xA  Contusion of left knee, initial encounter

 

 W01.0xxA  Fall same lev from slip/trip w/o strike against object, init

 

 M25.462  Effusion, left knee

 

 M17.12  Unilateral primary osteoarthritis, left knee

 

 M17.12  Unilateral primary osteoarthritis, left knee

 

 M21.062  Valgus deformity, not elsewhere classified, left knee

 

 S82.002A  Unsp fracture of left patella, init for clos fx

 

 W19.xxxA  Unspecified fall, initial encounter







Assessments







 Date  Code  Description  Provider

 

 2020  M25.562  Pain in left knee  Judy Spencer M.D.

 

 2020  S82.002D  Unspecified fracture of left patella,  Judy Spencer M.D.



     subsequent encounter for closed fracture with  



     routine healing  

 

 2020  W01.0xxD  Fall on same level from slipping, tripping and  Judy Spencer M.D.



     stumbling without subsequent striking against  



     object, subsequent encounter  

 

 2020  M25.462  Effusion, left knee  Judy Spencer M.D.

 

 2020  M17.12  Unilateral primary osteoarthritis, left knee  Judy Spencer M.D.

 

 2020  Z96.651  Presence of right artificial knee joint  Judy Spencer M.D.

 

 2020  Z00.00  Adult health examination  Nahomy Espinosa MD

 

 2020  M17.12  Unilateral primary osteoarthritis, left knee  Nahomy Espinosa MD

 

 2020  Z96.651  Presence of right artificial knee joint  Nahomy Espinosa MD

 

 2020  R73.01  Impaired fasting glycaemia  Nahomy Espinosa MD

 

 2020  F32.9  Reactive depression (situational)  Nahomy Espinosa MD

 

 2020  S82.002D  Unspecified fracture of left patella,  Judy Spencer M.D.



     subsequent encounter for closed fracture with  



     routine healing  

 

 2020  W01.0xxD  Fall on same level from slipping, tripping and  Judy Spencer M.D.



     stumbling without subsequent striking against  



     object, subsequent encounter  

 

 2020  M25.562  Pain in left knee  Judy Spencer M.D.

 

 2020  M25.462  Effusion, left knee  Judy Spencer M.D.

 

 2020  M17.12  Unilateral primary osteoarthritis, left knee  Judy Spencer M.D.

 

 2020  S80.02xD  Contusion of left knee, subsequent encounter  Judy Spencer M.D.

 

 2019  M25.562  Pain in left knee  Judy Spencer M.D.

 

 2019  M25.462  Effusion, left knee  Judy Spencer M.D.

 

 2019  M25.562  Pain in left knee  Judy Spencer M.D.

 

 2019  S80.02xA  Contusion of left knee, initial encounter  Judy Spencer M.D.

 

 2019  W01.0xxA  Fall on same level from slipping, tripping and  Judy Spencer M.D.



     stumbling without subsequent striking against  



     object, initial encounter  

 

 2019  M25.462  Effusion, left knee  Judy Spencer M.D.

 

 2019  M17.12  Unilateral primary osteoarthritis, left knee  Judy Spencer M.D.

 

 2019  M17.12  Unilateral primary osteoarthritis, left knee  Judy Spencer M.D.

 

 2019  M21.062  Valgus deformity, not elsewhere classified,  Judy Spencer M.D.



     left knee  

 

 2019  S82.002A  Unspecified fracture of left patella, initial  Judy Spencer M.D.



     encounter for closed fracture  

 

 2019  W19.xxxA  Unspecified fall, initial encounter  Judy Spencer M.D.







Plan of Treatment

Future Appointment(s):2020  8:30 am - Nahomy Espinosa MD at [facility]2020 - Judy Spencer M.D.M25.562 Pain in left kneeS82.002D 
Unspecified fracture of left patella, subsequent encounter for closed fracture 
with routinehealingFollow up:Follow up:   6 weeks for xray left jeplmosC12.0xxD 
Fall on same level from slipping, tripping and stumbling without subsequent 
striking against object, subsequent huaqqmapyX77.462 Effusion, left kneeM17.12 
Unilateral primary osteoarthritis, left kneeM25.562 Pain in left kneeS82.002D 
Unspecified fracture of left patella, subsequent encounter for closed fracture 
with routinehealingFollow up:Follow up:   6 weeks for xray left gyajgvmO77.0xxD 
Fall on same level from slipping, tripping and stumbling without subsequent 
striking against object, subsequent puhmysdcpN66.462 Effusion, left kneeM17.12 
Unilateral primary osteoarthritis, left knee



Functional Status







 Description

 

 No Information Available







Mental Status







 Description

 

 No Information Available







Referrals







 Description

 

 No Information Available

## 2020-04-20 NOTE — ED
GI/ HPI





- HPI Summary


HPI Summary: 


59 year old female presents with abd pain today.  States it started last night. 

She denies any nausea vomiting.  She admits to decreased appetite.  Pain is 

worse with certain movements.  She denies any urinary symptoms.  she admits to 

constipation but took a laxative this morning and had three BM with no change 

in pain.  No diarrhea.  She has had no previous abdominal surgeries.  No 

medical conditions.  took some Tylenol this morning.  





- History of Current Complaint


Chief Complaint: EDAbdPain


Time Seen by Provider: 04/20/20 20:03


Stated Complaint: ABD PAIN PER PT


Hx Last Menstrual Period: postmenapausal


Pain Intensity: 8





- Additional Pertinent History


Primary Care Physician: DIANA





- Allergy/Home Medications


Allergies/Adverse Reactions: 


 Allergies











Allergy/AdvReac Type Severity Reaction Status Date / Time


 


ibuprofen [From Advil] Allergy Intermediate Eyes Verified 04/20/20 18:40





   Itchy/Swollen/Red/Watery  











Home Medications: 


 Home Medications





Acetaminophen TAB* [Tylenol TAB*] 975 mg PO Q8HR  tab 04/24/19 [Rx Confirmed 04/ 20/20]


Docusate CAP* [Colace Cap*] 100 mg PO BID #90 cap 04/24/19 [Rx Confirmed 04/20/ 20]


Melatonin [Melatonin Maximum Strengt] 10 mg PO DAILY 04/20/20 [History 

Confirmed 04/20/20]











PMH/Surg Hx/FS Hx/Imm Hx


Endocrine/Hematology History: 


   Denies: Hx Anticoagulant Therapy


Respiratory History: 


   Denies: Hx Asthma


Musculoskeletal History: Reports: Hx Arthritis - osteoarthritis bilat knees


   Denies: Hx Rheumatoid Arthritis, Hx Osteoporosis


Sensory History: 


   Denies: Hx Contacts or Glasses, Hx Hearing Aid


Opthamlomology History: 


   Denies: Hx Contacts or Glasses


Psychiatric History: Reports: Hx Depression - 20 years ago





- Cancer History


Hx Chemotherapy: No


Hx Radiation Therapy: No





- Surgical History


Surgery Procedure, Year, and Place: Right Knee replacement


Infectious Disease History: No


Infectious Disease History: 


   Denies: Traveled Outside the [place] in Last 30 Days





- Family History


Known Family History: 


   Negative: Cardiac Disease, Hypertension, Diabetes





- Social History


Alcohol Use: Weekly


Substance Use Type: Reports: None


Smoking Status (MU): Never Smoked Tobacco





Review of Systems


Negative: Fever


Negative: Chest Pain


Negative: Shortness Of Breath


Positive: Abdominal Pain.  Negative: Vomiting, Diarrhea, Nausea


All Other Systems Reviewed And Are Negative: Yes





Physical Exam


Triage Information Reviewed: Yes


Vital Signs On Initial Exam: 


 Initial Vitals











Temp Pulse Resp BP Pulse Ox


 


 98.7 F   126   19   167/115   97 


 


 04/20/20 18:36  04/20/20 18:36  04/20/20 18:36  04/20/20 18:36  04/20/20 18:36











Vital Signs Reviewed: Yes


Appearance: Positive: Well-Appearing


Skin: Positive: Warm, Dry


Head/Face: Positive: Normal Head/Face Inspection


Eyes: Positive: Normal, Conjunctiva Clear


ENT: Positive: Pharynx normal


Respiratory/Lung Sounds: Positive: Clear to Auscultation, Breath Sounds Present


Cardiovascular: Positive: Normal, RRR


Abdomen Description: Positive: Soft, Other: - tenderness in RLQ


Bowel Sounds: Positive: Present


Musculoskeletal: Positive: Normal


Neurological: Positive: Normal


Psychiatric: Positive: Normal





Procedures





- Sedation


Patient Received Moderate/Deep Sedation with Procedure: No





Diagnostics





- Vital Signs


 Vital Signs











  Temp Pulse Resp BP Pulse Ox


 


 04/20/20 18:36  98.7 F  126  19  167/115  97














- Laboratory


Result Diagrams: 


 04/20/20 20:40





 04/20/20 20:40


Lab Statement: Any lab studies that have been ordered have been reviewed, and 

results considered in the medical decision making process.





- CT


  ** abd


CT Interpretation Completed By: Radiologist


Summary of CT Findings: 1. Appendicitis. There is a rounded fluid collection at 

the appendix/cecal junction which may reflect focally dilated appendix or 

possibly a small periappendiceal abscess measuring 14 mm. 2. Mild fatty 

infiltration of the liver. 3. Colonic diverticulosis without diverticulitis.





- Ultrasound


  ** No standard instances


Ultrasound Interpretation Completed By: Radiologist


Summary of Ultrasound Findings: IMPRESSION: 1. Small posterior fibroid 

measuring 10 x 10 x 7 mm. 2. Otherwise negative pelvic sonogram.





Re-Evaluation





- Re-Evaluation


  ** First Eval


Re-Evaluation Time: 21:34


Comment: pain still present





  ** Second Eval


Re-Evaluation Time: 23:10


Comment: pain improved





GIGU Course/Dx





- Course


Course Of Treatment: 59 year old female presents with abd pain today.  States 

it started last night. She denies any nausea vomiting.  She admits to decreased 

appetite.  Pain is worse with certain movements.  She denies any urinary 

symptoms.  she admits to constipation but took a laxative this morning and had 

three BM with no change in pain.  No diarrhea.  She has had no previous 

abdominal surgeries.  No medical conditions.  took some Tylenol this morning.  

On exam tenderness in the right lower quadrant. wbc 12. crp elevated. 

transvaginal ultrasound shows fibriod. CT abd shows appendicitis. started on 

zosyn. discussed with dr heredia who agrees to admit.





- Diagnoses


Differential Diagnoses - Female: Appendicitis, Gastroenteritis (Viral), Ovarian 

Cyst


Provider Diagnoses: 


 Appendicitis








- Critical Care Time


Critical Care Statement: Critical care time is provided exclusive of any time 

spent performing procedures.





Discharge ED





- Sign-Out/Discharge


Documenting (check all that apply): Patient Departure





- Discharge Plan


Condition: Stable


Disposition: ADMITTED TO [facility]





- Billing Disposition and Condition


Condition: STABLE


Disposition: Admitted to [facility]

## 2020-04-20 NOTE — UC
UC General HPI





- HPI Summary


HPI Summary: 





Reviewed RN triage - Pt awoke with severe abdominal pain in lower right quadrant

, that is still 6 or 7. Nausea during the night, but not vomiting. Also has 

bloating in abdomen. Has appendix.





60 yo female c/o abd pain since apprx 1am today. 


Pain woke her from sleep.  


Worse b/n 1-4am, better now but still hurts a lot. 


No n/v/d. 


Has felt constipated over the last month. 


Had bm x 4 today, no blood / melena (does have hx hemorrhoids). 


No urinary sx. 


No rash. 


No fever / chills. 


No sob /cp / palpitations. 


No back pain. 


No hx gi / gu surgery. 








- History of Current Complaint


Chief Complaint: UCAbdominalPain


Stated Complaint: ABDOMINAL PAIN


Time Seen by Provider: 04/20/20 17:48


Hx Obtained From: Patient


Hx Last Menstrual Period: postmenapausal


Pain Intensity: 5





- Allergy/Home Medications


Allergies/Adverse Reactions: 


 Allergies











Allergy/AdvReac Type Severity Reaction Status Date / Time


 


ibuprofen [From Advil] Allergy Intermediate Eyes Verified 04/20/20 17:38





   Itchy/Swollen/Red/Watery  











Home Medications: 


 Home Medications





Acetaminophen TAB* [Tylenol TAB*] 975 mg PO Q8HR  tab 04/24/19 [Rx Confirmed 04/ 20/20]


Docusate CAP* [Colace Cap*] 100 mg PO BID #90 cap 04/24/19 [Rx Confirmed 04/20/ 20]


Melatonin [Melatonin Maximum Strengt] 10 mg PO DAILY 04/20/20 [History 

Confirmed 04/20/20]











PMH/Surg Hx/FS Hx/Imm Hx


Previously Healthy: Yes





- Surgical History


Surgical History: Yes


Surgery Procedure, Year, and Place: Right Knee replacement





- Family History


Known Family History: 


   Negative: Cardiac Disease, Hypertension, Diabetes





- Social History


Alcohol Use: Weekly


Substance Use Type: None


Smoking Status (MU): Never Smoked Tobacco





Review of Systems


All Other Systems Reviewed And Are Negative: Yes


Constitutional: Positive: Fatigue


Skin: Positive: Negative


Eyes: Positive: Negative


ENT: Positive: Negative


Respiratory: Positive: Negative


Cardiovascular: Positive: Negative


Gastrointestinal: Positive: Other - see hpi


Genitourinary: Positive: Other - see hpi


Motor: Positive: Negative


Neurovascular: Positive: Negative


Musculoskeletal: Positive: Negative


Neurological/Mental Status: Positive: Negative


Psychological: Positive: Negative


Is Patient Immunocompromised?: No





Physical Exam


Triage Information Reviewed: Yes


Appearance: Well-Nourished - lying back on stretcher.  Looks tired, 

uncomfortable with exam, but nad


Vital Signs: 


 Initial Vital Signs











Temp  100.9 F   04/20/20 17:44


 


Pulse  118   04/20/20 17:44


 


Resp  16   04/20/20 17:44


 


BP  150/88   04/20/20 17:44


 


Pulse Ox  96   04/20/20 17:44











Vital Signs Reviewed: Yes


Eye Exam: Normal


ENT Exam: Normal


Neck exam: Normal


Respiratory Exam: Normal


Respiratory: Positive: Chest non-tender, Lungs clear, Normal breath sounds, No 

respiratory distress, No accessory muscle use


Cardiovascular Exam: Other -  - 110's, regular Nondiaphoretic.


Cardiovascular: Positive: Pulses Normal, Brisk Capillary Refill


Abdominal Exam: Other - + bs, quiet.  Tender throughout abdomen, worse RLQ.  

Voluntary guarding, no rebound.  No cvat.   Mild pelvic discomfort, directed 

toward lower R abd.  No rash noted


Bowel Sounds: Positive: Hypoactive


Musculoskeletal Exam: Normal - moves x 4 ext's gait steady, but looks 

uncomfortable


Neurological Exam: Normal - grossly intact


Psychological Exam: Normal - nad


Skin Exam: Normal - nondiaphoretic no visible or reported rash





Course/Dx





- Course


Course Of Treatment: 





Reviewed urine dip with pt. 


Reviewed coa / tx plan. 


Recommend ED evaluation and treatment. 


EMS declined, spouse will drive her to the ED. 


Advised not to eat or drink en route.  





ED notified, spoke with Nikky SUÁREZ.





- Diagnoses


Provider Diagnosis: 


 Acute abdominal pain








Discharge ED





- Sign-Out/Discharge


Documenting (check all that apply): Patient Departure


All imaging exams completed and their final reports reviewed: No Studies





- Discharge Plan


Condition: Guarded


Disposition: HOME-RECOMMEND TO ED


Patient Education Materials:  Acute Abdominal Pain (ED)


Referrals: 


Nahomy Beach MD [Primary Care Provider] - 


Additional Instructions: 


Please go directly to the Emergency Department. 





Stop and call 911 if problems on the way. 





Do not eat or drink anything on the way to the Emergency Department.





When you are feeling better, make sure that you follow up with your primary 

care physician.  


Have your urine checked to make sure that the trace blood is no longer present. 





 





- Billing Disposition and Condition


Condition: GUARDED


Disposition: Home-Recommend to ED

## 2020-04-20 NOTE — XMS REPORT
Continuity of Care Document (CCD)

 Created on:2020



Patient:Isabella Guzman

Sex:Female

:1960

External Reference #:[id number]





Demographics







 Address  [address] [place], [place] [address]

 

 Mobile Phone  [phone]

 

 Email Address  [email or web address]

 

 Preferred Language  en

 

 Marital Status  Not  or 

 

 Jain Affiliation  Unknown

 

 Race  White

 

 Ethnic Group  Not  or 









Author







 Name  Judy Spencer M.D. (transmitted by agent of provider Megan Rosenthal)

 

 Address  [address] [place] [address] [address]



   [place] [address]









Care Team Providers







 Name  Role  Phone

 

 Nahomy Espinosa MD - Family Medicine  Care Team Information   [phone]
[phone]









Problems







 Active Problems  Provider  Date

 

 Localized, primary osteoarthritis  Judy Spencer M.D.  Onset: 2019

 

 Acquired genu valgum  Judy Spencer M.D.  Onset: 2019

 

 History of total knee arthroplasty    Onset: 2019

 

 Postmenopausal bleeding    Onset: 2018









 Note: LMP 12/15, 4 days of VB , had negative endometrial biopsy Dr Burkett 
.



 No bleeding since.









 Genetic counseling    Onset: 2018

 

 Gluten sensitivity    Onset: 2018

 

 Obesity    Onset: 2017

 

 Metabolic syndrome X    Onset: 2017

 

 Seasonal affective disorder    Onset: 2017

 

 Hereditary essential tremor    Onset: 2017

 

 Vitamin D deficiency    Onset: 2016

 

 Hyperlipidemia    Onset: 2016

 

 Osteoarthritis of knee    Onset: 2016

 

 Generalized anxiety disorder    Onset: 2013

 

 Perimenopausal disorder    Onset: 2011

 

 Postpartum depression    Onset: 2001

 

 Closed fracture of patella  Judy Spencer M.D.  Onset: 2020







Social History







 Type  Date  Description  Comments

 

 Birth Sex    Unknown  

 

 Tobacco Use  Start: Unknown  Never Smoked Cigarettes  

 

 Smoking Status  Reviewed: 20  Never Smoked Cigarettes  

 

 ETOH Use    Rarely consumes alcohol  

 

 Tobacco Use  Start: Unknown  Patient has never smoked  

 

 Exercise Type/Frequency    Exercises regularly  

 

 Guns in Home    No  







Allergies, Adverse Reactions, Alerts







 Description

 

 No Known Drug Allergies







Medications







 Active Medications  SIG  Qnty  Indications  Ordering Provider  Date

 

 Amoxicillin  take 4 pills, 2 g  4caps  Z47.1  Judy Tj,  2019



           500mg  1 hour before      M.D.  



 Capsules  dental or gi        



   procedure        

 

 Vitamin D3  one every daily      Unknown  



          5000Unit          



 Capsules          



           

 

 Tylenol  2 by mouth every 6      Unknown  



       325mg Tablets  hours as needed        



   pain        

 

 Aleve  as directed      Unknown  



     220mg Tablets          



           

 

 Naproxen Sodium  prn for joint      Unknown  



               220mg  pain, takes it        



 Tablets  rarely        



           







Medications Administered in Office







 Medication  SIG  Qnty  Indications  Ordering Provider  Date

 

 DTaP,Unspecified        Unknown  10/01/2006



   Injection          







Immunizations







 CPT Code  Status  Date  Vaccine  Lot #

 

 [id number]  Given  10/01/2019  Influenza Virus Vaccine, Quadrivalent, Split,  



       Preservative Free  

 

 [id number]  Given  2017  Influenza Virus Vaccine, Quadrivalent, Split, Im Use
  



       6-35mo  

 

 [id number]  Given  2017  Tdap - Tetanus/Diptheria/Acellular Pertussis  

 

 [id number]  Given  10/01/2015  Flu Vaccine  







Vital Signs







 Date  Vital  Result  Comment

 

 2020 10:26am  Height  64 inches  5'4"









 Weight  195.00 lb  

 

 Heart Rate  87 /min  

 

 BP Systolic  150 mmHg  

 

 BP Diastolic  85 mmHg  

 

 Body Temperature  98.3 F  

 

 O2 % BldC Oximetry  98 %  

 

 BMI (Body Mass Index)  33.5 kg/m2  









 2020  9:54am  Height  64 inches  5'4"









 Weight  190.00 lb  

 

 Heart Rate  120 /min  

 

 BP Systolic  120 mmHg  

 

 BP Diastolic  81 mmHg  

 

 Body Temperature  97.9 F  

 

 Pain Level  0  

 

 BMI (Body Mass Index)  32.6 kg/m2  







Results







 Test  Acquired Date  Facility  Test  Result  H/L  Range  Note

 

 Basic Metabolic  2020  [facility]  Sodium  140 mmol/L  Normal
  135-145  1



 Panel    [address] [place] [address] [phone]          









 Potassium  4.1 mmol/L  Normal  3.5-5.0  

 

 Chloride  106 mmol/L  Normal  101-111  

 

 Co2 Carbon Dioxide  29 mmol/L  Normal  22-32  

 

 Anion Gap  5 mmol/L  Normal  2-11  

 

 Glucose  106 mg/dL  High    

 

 Blood Urea Nitrogen  11 mg/dL  Normal  6-24  

 

 Creatinine  0.69 mg/dL  Normal  0.51-0.95  

 

 BUN/Creatinine Ratio  15.9  Normal  8-20  

 

 Calcium  8.9 mg/dL  Normal  8.6-10.3  

 

 Egfr Non-  87.7    >60  

 

 Egfr   106.1    >60  2









 Lipid Profile  2020  [facility]  Triglycerides  134 mg/dL    
  3



 (Trig/Chol/HDL)    [address]          



     [place] [address] [phone]          









 Cholesterol  138 mg/dL      4

 

 HDL Cholesterol  38.0 mg/dL      5

 

 LDL Cholesterol  73 mg/dL      6









 Laboratory test  2020  [facility]  Hemoglobin A1c  5.7 %  
High  4.0-5.6  7



 finding    [address]  (Glyco HGB)        



     [place] [address] [phone]          









 1  FASTING

 

 2  *******Because ethnic data is not always readily available,



   this report includes an eGFR for both -Americans and



   non- Americans.****



   The National Kidney Disease Education Program (NKDEP) does



   not endorse the use of the MDRD equation for patients that



   are not between the ages of 18 and 70, are pregnant, have



   extremes of body size, muscle mass, or nutritional status,



   or are non- or non-.



   According to the National Kidney Foundation, irrespective of



   diagnosis, the stage of the disease is based on the level of



   kidney function:



   Stage Description                      GFR(mL/min/1.73 m(2))



   1     Kidney damage with normal or decreased GFR       90



   2     Kidney damage with mild decrease in GFR          60-89



   3     Moderate decrease in GFR                         30-59



   4     Severe decrease in GFR                           15-29



   5     Kidney failure                       <15 (or dialysis)

 

 3  Desirable: <150



   Borderline High: 150-199



   High: 200-499



   Very High: >500

 

 4  Desirable: <200



   Borderline High: 200-239



   High: >239

 

 5  Low: <40



   Desirable: 40-60



   High: >60

 

 6  Desirable: <100



   Near Optimal: 100-129



   Borderline High: 130-159



   High: 160-189



   Very High: >189

 

 7  Therapeutic target for the treatment of diabetes



   mellitus patients is <7% HBA1C, and in selective



   patients <6.0%.  Please refer to American Diabetes



   Association diabetic care guidelines for further



   information.







Procedures







 Date  Code  Description  Status

 

 2019  82405003  Mammogram  Completed

 

 2013  50859586  Colonoscopy  Completed







Medical Devices







 Description

 

 No Information Available







Encounters







 Type  Date  Location  Provider  Dx  Diagnosis

 

 Office Visit  2020  [facility]  Nahomy Espinosa MD  Z00.00  Encntr for 
general



   10:30a  [facility]      adult medical exam



           w/o abnormal



           findings









 M17.12  Unilateral primary osteoarthritis, left knee

 

 Z96.651  Presence of right artificial knee joint

 

 R73.01  Impaired fasting glucose

 

 F32.9  Major depressive disorder, single episode, unspecified









 Office Visit  2020  9:45a  [facility]  Judy Spencer,  S82.002D  
Unsp fracture



     at [place]  M.D.    of left



           patella, subs



           for clos fx w



           routn heal









 W01.0xxD  Fall same lev from slip/trip w/o strike against object, subs

 

 M25.562  Pain in left knee

 

 M25.462  Effusion, left knee

 

 M17.12  Unilateral primary osteoarthritis, left knee

 

 S80.02xD  Contusion of left knee, subsequent encounter









 Office Visit  2019  8:15a  [facility]  Judy Spencer,  M25.562  
Pain in left



     at [place]  M.D.    knee









 M25.462  Effusion, left knee

 

 M25.562  Pain in left knee

 

 S80.02xA  Contusion of left knee, initial encounter

 

 W01.0xxA  Fall same lev from slip/trip w/o strike against object, init

 

 M25.462  Effusion, left knee

 

 M17.12  Unilateral primary osteoarthritis, left knee

 

 M17.12  Unilateral primary osteoarthritis, left knee

 

 M21.062  Valgus deformity, not elsewhere classified, left knee

 

 S82.002A  Unsp fracture of left patella, init for clos fx

 

 W19.xxxA  Unspecified fall, initial encounter







Assessments







 Date  Code  Description  Provider

 

 2020  Z00.00  Adult health examination  Nahomy Espinosa MD

 

 2020  M17.12  Unilateral primary osteoarthritis, left knee  Nahomy Espinosa MD

 

 2020  Z96.651  Presence of right artificial knee joint  Nahomy Espinosa MD

 

 2020  R73.01  Impaired fasting glycaemia  Nahomy Espinosa MD

 

 2020  F32.9  Reactive depression (situational)  Nahomy Espinosa MD

 

 2020  S82.002D  Unspecified fracture of left patella,  Judy Spencer M.D.



     subsequent encounter for closed fracture with  



     routine healing  

 

 2020  W01.0xxD  Fall on same level from slipping, tripping and  Judy Spencer M.D.



     stumbling without subsequent striking against  



     object, subsequent encounter  

 

 2020  M25.562  Pain in left knee  Judy Spencer M.D.

 

 2020  M25.462  Effusion, left knee  Judy Spencer M.D.

 

 2020  M17.12  Unilateral primary osteoarthritis, left knee  Judy Spencer M.D.

 

 2020  S80.02xD  Contusion of left knee, subsequent encounter  Judy Spencer M.D.

 

 2019  M25.562  Pain in left knee  Judy Spencer M.D.

 

 2019  M25.462  Effusion, left knee  Judy Spencer M.D.

 

 2019  M25.562  Pain in left knee  Judy Spencer M.D.

 

 2019  S80.02xA  Contusion of left knee, initial encounter  Judy Spencer M.D.

 

 2019  W01.0xxA  Fall on same level from slipping, tripping and  Judy Spencer M.D.



     stumbling without subsequent striking against  



     object, initial encounter  

 

 2019  M25.462  Effusion, left knee  Judy Spencer M.D.

 

 2019  M17.12  Unilateral primary osteoarthritis, left knee  Judy Spencer M.D.

 

 2019  M17.12  Unilateral primary osteoarthritis, left knee  Judy Spencer M.D.

 

 2019  M21.062  Valgus deformity, not elsewhere classified,  Judy Spencer M.D.



     left knee  

 

 2019  S82.002A  Unspecified fracture of left patella, initial  Judy Spencer M.D.



     encounter for closed fracture  

 

 2019  W19.xxxA  Unspecified fall, initial encounter  Judy Spencer M.D.







Plan of Treatment

Future Appointment(s):2020  8:15 am - Judy Spencer M.D. at [facility]2020  8:30 am - Nahomy Espinosa MD at [facility]



Functional Status







 Description

 

 No Information Available







Mental Status







 Description

 

 No Information Available







Referrals







 Description

 

 No Information Available

## 2020-04-20 NOTE — XMS REPORT
Continuity of Care Document (CCD)

 Created on:2020



Patient:Isabella Guzman

Sex:Female

:1960

External Reference #:[id number]





Demographics







 Address  [address] [place] [address]

 

 Mobile Phone  [phone]

 

 Email Address  [email or web address]

 

 Preferred Language  en

 

 Marital Status  Not  or 

 

 Mormonism Affiliation  Unknown

 

 Race  White

 

 Ethnic Group  Not  or 









Author







 Name  Nahomy Espinosa MD (transmitted by agent of provider Sherrill Olmos)

 

 Address  [address]



   [place] [address]









Care Team Providers







 Name  Role  Phone

 

 Nahomy Espinosa MD - Family Medicine  Care Team Information   [phone]
[phone]









Problems







 Active Problems  Provider  Date

 

 Localized, primary osteoarthritis  Judy Spencer M.D.  Onset: 2019

 

 Acquired genu valgum  Judy Spencer M.D.  Onset: 2019

 

 History of total knee arthroplasty    Onset: 2019

 

 Postmenopausal bleeding    Onset: 2018









 Note: LMP 12/15, 4 days of VB , had negative endometrial biopsy Dr Burkett 
.



 No bleeding since.









 Genetic counseling    Onset: 2018

 

 Gluten sensitivity    Onset: 2018

 

 Obesity    Onset: 2017

 

 Metabolic syndrome X    Onset: 2017

 

 Seasonal affective disorder    Onset: 2017

 

 Hereditary essential tremor    Onset: 2017

 

 Vitamin D deficiency    Onset: 2016

 

 Hyperlipidemia    Onset: 2016

 

 Osteoarthritis of knee    Onset: 2016

 

 Generalized anxiety disorder    Onset: 2013

 

 Perimenopausal disorder    Onset: 2011

 

 Postpartum depression    Onset: 2001

 

 Closed fracture of patella  Judy Spencer M.D.  Onset: 2020







Social History







 Type  Date  Description  Comments

 

 Birth Sex    Unknown  

 

 Tobacco Use  Start: Unknown  Never Smoked Cigarettes  

 

 Smoking Status  Reviewed: 20  Never Smoked Cigarettes  

 

 ETOH Use    Rarely consumes alcohol  

 

 Tobacco Use  Start: Unknown  Patient has never smoked  

 

 Exercise Type/Frequency    Exercises regularly  

 

 Guns in Home    No  







Allergies, Adverse Reactions, Alerts







 Description

 

 No Known Drug Allergies







Medications







 Active Medications  SIG  Qnty  Indications  Ordering Provider  Date

 

 Amoxicillin  take 4 pills, 2 g  4caps  Z47.1  Judy Spencer,  2019



           500mg  1 hour before      M.D.  



 Capsules  dental or gi        



   procedure        

 

 Vitamin D3  one every daily      Unknown  



          5000Unit          



 Capsules          



           

 

 Tylenol  2 by mouth every 6      Unknown  



       325mg Tablets  hours as needed        



   pain        

 

 Aleve  as directed      Unknown  



     220mg Tablets          



           

 

 Naproxen Sodium  prn for joint      Unknown  



               220mg  pain, takes it        



 Tablets  rarely        



           







Medications Administered in Office







 Medication  SIG  Qnty  Indications  Ordering Provider  Date

 

 DTaP,Unspecified        Unknown  10/01/2006



   Injection          







Immunizations







 CPT Code  Status  Date  Vaccine  Lot #

 

 [id number]  Given  10/01/2019  Influenza Virus Vaccine, Quadrivalent, Split,  



       Preservative Free  

 

 [id number]  Given  2017  Influenza Virus Vaccine, Quadrivalent, Split, Im Use
  



       6-35mo  

 

 [id number]  Given  2017  Tdap - Tetanus/Diptheria/Acellular Pertussis  

 

 [id number]  Given  10/01/2015  Flu Vaccine  







Vital Signs







 Date  Vital  Result  Comment

 

 2020 10:26am  Height  64 inches  5'4"









 Weight  195.00 lb  

 

 Heart Rate  87 /min  

 

 BP Systolic  150 mmHg  

 

 BP Diastolic  85 mmHg  

 

 Body Temperature  98.3 F  

 

 O2 % BldC Oximetry  98 %  

 

 BMI (Body Mass Index)  33.5 kg/m2  









 2020  9:54am  Height  64 inches  5'4"









 Weight  190.00 lb  

 

 Heart Rate  120 /min  

 

 BP Systolic  120 mmHg  

 

 BP Diastolic  81 mmHg  

 

 Body Temperature  97.9 F  

 

 Pain Level  0  

 

 BMI (Body Mass Index)  32.6 kg/m2  







Results







 Test  Acquired Date  Facility  Test  Result  H/L  Range  Note

 

 Basic Metabolic  2020  [facility]  Sodium  140 mmol/L  Normal
  135-145  1



 Panel    [address] [place] [address] [phone]          









 Potassium  4.1 mmol/L  Normal  3.5-5.0  

 

 Chloride  106 mmol/L  Normal  101-111  

 

 Co2 Carbon Dioxide  29 mmol/L  Normal  22-32  

 

 Anion Gap  5 mmol/L  Normal  2-11  

 

 Glucose  106 mg/dL  High    

 

 Blood Urea Nitrogen  11 mg/dL  Normal  6-24  

 

 Creatinine  0.69 mg/dL  Normal  0.51-0.95  

 

 BUN/Creatinine Ratio  15.9  Normal  8-20  

 

 Calcium  8.9 mg/dL  Normal  8.6-10.3  

 

 Egfr Non-  87.7    >60  

 

 Egfr   106.1    >60  2









 Lipid Profile  2020  [facility]  Triglycerides  134 mg/dL    
  3



 (Trig/Chol/HDL)    [address]          



     [place] [address] [phone]          









 Cholesterol  138 mg/dL      4

 

 HDL Cholesterol  38.0 mg/dL      5

 

 LDL Cholesterol  73 mg/dL      6









 Laboratory test  2020  [facility]  Hemoglobin A1c  5.7 %  
High  4.0-5.6  7



 finding    [address]  (Glyco HGB)        



     [place] [address] [phone]          









 1  FASTING

 

 2  *******Because ethnic data is not always readily available,



   this report includes an eGFR for both -Americans and



   non- Americans.****



   The National Kidney Disease Education Program (NKDEP) does



   not endorse the use of the MDRD equation for patients that



   are not between the ages of 18 and 70, are pregnant, have



   extremes of body size, muscle mass, or nutritional status,



   or are non- or non-.



   According to the National Kidney Foundation, irrespective of



   diagnosis, the stage of the disease is based on the level of



   kidney function:



   Stage Description                      GFR(mL/min/1.73 m(2))



   1     Kidney damage with normal or decreased GFR       90



   2     Kidney damage with mild decrease in GFR          60-89



   3     Moderate decrease in GFR                         30-59



   4     Severe decrease in GFR                           15-29



   5     Kidney failure                       <15 (or dialysis)

 

 3  Desirable: <150



   Borderline High: 150-199



   High: 200-499



   Very High: >500

 

 4  Desirable: <200



   Borderline High: 200-239



   High: >239

 

 5  Low: <40



   Desirable: 40-60



   High: >60

 

 6  Desirable: <100



   Near Optimal: 100-129



   Borderline High: 130-159



   High: 160-189



   Very High: >189

 

 7  Therapeutic target for the treatment of diabetes



   mellitus patients is <7% HBA1C, and in selective



   patients <6.0%.  Please refer to American Diabetes



   Association diabetic care guidelines for further



   information.







Procedures







 Date  Code  Description  Status

 

 2019  37704148  Mammogram  Completed

 

 2013  30521031  Colonoscopy  Completed







Medical Devices







 Description

 

 No Information Available







Encounters







 Type  Date  Location  Provider  Dx  Diagnosis

 

 Office Visit  2020  [facility]  Nahomy Espinosa MD  Z00.00  Encntr for 
general



   10:30a  [facility]      adult medical exam



           w/o abnormal



           findings









 M17.12  Unilateral primary osteoarthritis, left knee

 

 Z96.651  Presence of right artificial knee joint

 

 R73.01  Impaired fasting glucose

 

 F32.9  Major depressive disorder, single episode, unspecified









 Office Visit  2020  9:45a  [facility]  Judy Spencer,  S82.002D  
Unsp fracture



     at [place]  M.D.    of left



           patella, subs



           for clos fx w



           routn heal









 W01.0xxD  Fall same lev from slip/trip w/o strike against object, subs

 

 M25.562  Pain in left knee

 

 M25.462  Effusion, left knee

 

 M17.12  Unilateral primary osteoarthritis, left knee

 

 S80.02xD  Contusion of left knee, subsequent encounter









 Office Visit  2019  8:15a  [facility]  Judy Spencer,  M25.562  
Pain in left



     at [place]  M.D.    knee









 M25.462  Effusion, left knee

 

 M25.562  Pain in left knee

 

 S80.02xA  Contusion of left knee, initial encounter

 

 W01.0xxA  Fall same lev from slip/trip w/o strike against object, init

 

 M25.462  Effusion, left knee

 

 M17.12  Unilateral primary osteoarthritis, left knee

 

 M17.12  Unilateral primary osteoarthritis, left knee

 

 M21.062  Valgus deformity, not elsewhere classified, left knee

 

 S82.002A  Unsp fracture of left patella, init for clos fx

 

 W19.xxxA  Unspecified fall, initial encounter







Assessments







 Date  Code  Description  Provider

 

 2020  Z00.00  Adult health examination  Nahomy Espinosa MD

 

 2020  M17.12  Unilateral primary osteoarthritis, left knee  Nahomy Espinosa MD

 

 2020  Z96.651  Presence of right artificial knee joint  Nahomy Espinosa MD

 

 2020  R73.01  Impaired fasting glycaemia  Nahomy Espinosa MD

 

 2020  F32.9  Reactive depression (situational)  Nahomy Espinosa MD

 

 2020  S82.002D  Unspecified fracture of left patella,  Judy Spencer M.D.



     subsequent encounter for closed fracture with  



     routine healing  

 

 2020  W01.0xxD  Fall on same level from slipping, tripping and  Judy Spencer M.D.



     stumbling without subsequent striking against  



     object, subsequent encounter  

 

 2020  M25.562  Pain in left knee  Judy Spencer M.D.

 

 2020  M25.462  Effusion, left knee  Judy Spencer M.D.

 

 2020  M17.12  Unilateral primary osteoarthritis, left knee  Judy Spencer M.D.

 

 2020  S80.02xD  Contusion of left knee, subsequent encounter  Judy Spencer M.D.

 

 2019  M25.562  Pain in left knee  Judy Spencer M.D.

 

 2019  M25.462  Effusion, left knee  Judy Spencer M.D.

 

 2019  M25.562  Pain in left knee  Judy Spencer M.D.

 

 2019  S80.02xA  Contusion of left knee, initial encounter  Judy Spencer M.D.

 

 2019  W01.0xxA  Fall on same level from slipping, tripping and  Judy Spencer M.D.



     stumbling without subsequent striking against  



     object, initial encounter  

 

 2019  M25.462  Effusion, left knee  Judy Spencer M.D.

 

 2019  M17.12  Unilateral primary osteoarthritis, left knee  Judy Spencer M.D.

 

 2019  M17.12  Unilateral primary osteoarthritis, left knee  Judy Spencer M.D.

 

 2019  M21.062  Valgus deformity, not elsewhere classified,  Judy Spencer M.D.



     left knee  

 

 2019  S82.002A  Unspecified fracture of left patella, initial  Judy Spencer M.D.



     encounter for closed fracture  

 

 2019  W19.xxxA  Unspecified fall, initial encounter  Judy Spencer M.D.







Plan of Treatment

Future Appointment(s):2020  8:30 am - Nahomy Espinosa MD at [facility]2020  8:15 am - Judy Spencer M.D. at [facility]2020 - Nahomy Espinosa MDZ00.00 Adult health examinationComments:
Adult health examination:Diet: discussed healthy diet, which should include 
primary whole foods and plants.Exercise: discussed guidelines and recommended 
at least 150 minutes of aerobic exercise per week, can be broken up into 
smaller increments, and the importance of strength training at least twice a 
week. She will resume soon. Immunizations: reviewed, she is UTDBreast cancer 
screening: reviewed NIH guidelines which suggests every other year mammograms 
beginning at age 50 for average risk women; will do every other year, I 
recommend SBE on a regular basis, due in olon cancer screening: discussed 
options for women at average risk beginning at age 50, options include 
Cologuard or colonoscopy,up to dateCervical cancer screening: discussed 
guidelines for women at average risk; between ages 30 and 64 women at low risk 
may have pap and HPV testing every five years, she is UTD and pap smear is not 
necessary at this time. Advanced directives: discussed with patient living will 
and health care proxy,she has informationLabs: reviewedRecheck in 5 months then 
6 m f/uFollow up:f/u in 6 months, labs in advance also f/u in 1 year for 
mffcrpW47.12 Unilateral primary osteoarthritis, left kneeComments:no current 
pain but plans left knee replacement in vksdtwD80.651 Presence of right 
artificial knee jointComments:doing wellR73.01 Impaired fasting 
glycaemiaComments:reviewed labs and HA1C better last time we checked, will keep 
an eye on it and recheck in 5 [id number] Reactive depression (situational)
Comments:doing ok under circumstances, has appt for FSAP at [place], she has 
support from friends and her 's care is being well managed.She has been 
approved for FMLA for 6 months.



Functional Status







 Description

 

 No Information Available







Mental Status







 Description

 

 No Information Available







Referrals







 Description

 

 No Information Available

## 2020-04-21 VITALS — SYSTOLIC BLOOD PRESSURE: 131 MMHG | DIASTOLIC BLOOD PRESSURE: 86 MMHG

## 2020-04-21 RX ADMIN — MORPHINE SULFATE PRN MG: 2 INJECTION, SOLUTION INTRAMUSCULAR; INTRAVENOUS at 06:16

## 2020-04-21 RX ADMIN — MORPHINE SULFATE PRN MG: 2 INJECTION, SOLUTION INTRAMUSCULAR; INTRAVENOUS at 02:42

## 2020-04-21 NOTE — HP
H&P (Free Text)


History and Physical: 


CC: RLQ abd pain





HPI: 60 yo F with no significant PMH presents with RLQ abd pain that started 1 

am on Monday 4/20.  She felt and slept well up to that point.  She notes 

similar pains in past but those resolved over hours.  Pain became progressively 

severe and she took Tylenol without relief.  She last ate Sunday night.  

Thought constipation and took laxative with +BM.  No dysuria/hematuria.   No N/V

/D.  Decr appetite. Pain worse with mov't and can't stand upright due to pain.  

As pain persisted she presented to the [facility] and was transfered to [facility].  Had 

improvement with narcotics/Toradol.  CT showed appendicitis.





PMH:  None





PSH: Lipoma excision R abdomen


   R TKR 2019





Meds: Melatonin





All: "brown coating on Advil tablets" causes hay fever like symptoms





SH: No tob; +EtOH (2 drinks/wk); no drugs; works at [employer]





FH: HTN





ROS: Notable for above +/-; otherwise complete review negative.





PE:


 Vital Signs











Temp  99.3 F   04/21/20 04:06


 


Pulse  101   04/21/20 04:06


 


Resp  16   04/21/20 07:07


 


BP  105/64   04/21/20 04:06


 


Pulse Ox  95   04/21/20 04:06





Gen: NAD; lying in bed.


HEENT: NCAT; EOMI


Lungs: CTA


Heart: reg, s1s2; no murmur


Abd: healed scar R abdomen; soft; tender in RLQ +Rovsing.


Ext: warm; no C/C/E; 2+DP pulses B


 Intake & Output











 04/20/20 04/21/20 04/21/20





 18:59 06:59 18:59


 


Intake Total  1000 


 


Balance  1000 


 


Weight 200 lb 200 lb 


 


Intake:   


 


  IV Fluids  1000 


 


  Oral  0 


 


Other:   


 


  # Voids  1 








 Laboratory Results - last 24 hr











  04/20/20 04/20/20





  20:40 20:40


 


WBC  12.0 H 


 


RBC  4.69 


 


Hgb  14.6 


 


Hct  43 


 


MCV  91 


 


MCH  31 


 


MCHC  34 


 


RDW  13 


 


Plt Count  218 


 


MPV  7.9 


 


Neut % (Auto)  92.3 


 


Lymph % (Auto)  4.7 


 


Mono % (Auto)  2.8 


 


Eos % (Auto)  0.1 


 


Baso % (Auto)  0.1 


 


Absolute Neuts (auto)  11.1 H 


 


Absolute Lymphs (auto)  0.6 L 


 


Absolute Monos (auto)  0.3 


 


Absolute Eos (auto)  0.0 


 


Absolute Basos (auto)  0.0 


 


Absolute Nucleated RBC  0.0 


 


Nucleated RBC %  0.0 


 


Sodium   137


 


Potassium   3.4 L


 


Chloride   105


 


Carbon Dioxide   26


 


Anion Gap   6


 


BUN   9


 


Creatinine   0.63


 


Est GFR ( Amer)   117.9


 


Est GFR (Non-Af Amer)   97.4


 


BUN/Creatinine Ratio   14.3


 


Glucose   126 H


 


Calcium   8.8


 


Total Bilirubin   0.50


 


AST   13


 


ALT   20


 


Alkaline Phosphatase   82


 


C-Reactive Protein   153.89 H


 


Total Protein   7.0


 


Albumin   4.1


 


Globulin   2.9


 


Albumin/Globulin Ratio   1.4


 


Lipase   < 10 L








CT images reviewed.  





Assessment:  60 yo F with acute appendicitis





Plan: Laparoscopic appendectomy later today.  NPO. IVF. Cont Zosyn.

## 2020-04-21 NOTE — BRIEFOPN
Brief Operative/Procedure Note





- Operation Details


Pre-Op Diagnosis: Acute appendicitis


Post-Op Diagnosis: Acute appendicitis


Procedures: Laparoscopic appendectomy


Surgeon(s)/Proceduralists: Dr. Braun


Anesthesia: GETA


Estimated Blood Loss: Scant


Findings: As above


Specimen(s)/Culture(s) Description: Appendix


Complications: None


Miscellaneous Procedure Notes: None

## 2020-04-21 NOTE — DS
Admit date: 4/20/2020


Discharge date: 4/21/2020


Admit diagnosis: Acute appendicitis


Discharge diagnosis: Acute appendicitis





PEX


General: Alert, in NAD.


Integumentary: No rashes, jaundice, petechia.


HEENT: Oropharynx clear. Trachea midline. PERRLA.


Heart: RRR, no MRG.


Lungs: CTAB, no WRR.


ABD: BS present. Soft, nondistended. Tender around incisions, which are C/D/I.


Extremities: Distal pulses intact bilaterally. No edema. Calves soft and 

nontender.





Labs: Unremarkable.





Procedure: Laparoscopic appendectomy





Hospital course: Presented with ABD to ED on 4/20/2020 and diagnosed with acute 

appendicitis. Admitted to [facility]. The next day, she was taken for the above named 

procedure, which was tolerated well. After appropriately recovering from 

anesthesia, she was discharged from PACU. 





Instructions were given to the pt regarding diet, meds, activity, post op 

follow up, and care for incisions. All questions were answered.





Discharged home in stable condition on 4/21/2020.

## 2020-04-21 NOTE — OP
OPERATIVE REPORT:

 

DATE OF OPERATION:  04/21/20

 

DATE OF BIRTH:  [birth year]

 

SURGEON:  Yan Braun MD

 

PRE-OP DIAGNOSIS:  Appendicitis.

 

POST-OP DIAGNOSIS:  Appendicitis.

 

OPERATIVE PROCEDURE:  Laparoscopic appendectomy.

 

INDICATIONS FOR PROCEDURE:  Appendicitis.  Risks including, but not limited to 
bleeding, infection, injury to intraabdominal contents including the bowel, 
others explained to the patient, who seemed to understand and agreed to the 
procedure and all questions were answered.

 

DESCRIPTION OF PROCEDURE:  The patient was taken to the operating room, placed 
supine and preoperative antibiotics have been given.  After the successful 
induction of general endotracheal anesthesia, the abdomen was prepped and 
draped in a sterile fashion.  A time-out was performed indicating correct 
patient, correct procedure.  A left lower quadrant 5-mm trocar, Optiview 
bladeless was placed under direct visualization of the camera.  A 12-mm 
umbilical and 5-mm suprapubic trocar was placed.  The patient was placed in 
Trendelenburg position, titled slightly towards her left.  An inflamed appendix
  was noted.  Appendix was gently dissected free from the surrounding tissue, 
the base was isolated, divided with a stapler along with the mesoappendix.  A 
tan/gold load on the bowel, silver/grey load on the mesoappendix.  EBL minimal.
  Hemostasis was intact.  The appendix was placed into an Endobag and removed 
through the umbilical port site.  The abdomen was scanned.  Pelvis was 
aspirated of any fluid.  No abscess was noted.  The right lower quadrant was 
irrigated and aspirated dry.  Bowel was scanned, no obvious injury was noted.  
Pneumoperitoneum was brought down to 5.  The insufflation was shut off and then 
the remaining gas was aspirated through the suction on the filter.  Trocars 
were removed.  The skin was closed with 3-0 Monocryl.  Glue was applied to the 
skin.  She tolerated the procedure well.

 

 [id number]/[facility] #: [id number]

[facility]

## 2020-04-23 NOTE — UC
- Progress Note


Progress Note: 


Urine culture from April 20, 2020 comes back as positive for strep group B 10-25

,000.





Patient went to the emergency Department on the same date and was treated for 

acute appendicitis and discharged from the hospital.





Urinary strep group B does not always have to be treated.





Urinary strep group B is treated if the person is symptomatic.





Nursing to call patient find out if they have symptoms for urinary tract 

infection.





If they do not there is no need to treat with an antibiotic if they do have 

urinary tract infection symptoms we will need to treat with an antibiotic.





Course/Dx





- Diagnoses


Provider Diagnoses: 


 Acute abdominal pain








Discharge ED





- Sign-Out/Discharge


Documenting (check all that apply): Patient Departure


All imaging exams completed and their final reports reviewed: No Studies





- Discharge Plan


Condition: Guarded


Disposition: HOME-RECOMMEND TO ED


Patient Education Materials:  Acute Abdominal Pain (ED)


Referrals: 


Nahomy Beach MD [Primary Care Provider] - 


Additional Instructions: 


Please go directly to the Emergency Department. 





Stop and call 911 if problems on the way. 





Do not eat or drink anything on the way to the Emergency Department.





When you are feeling better, make sure that you follow up with your primary 

care physician.  


Have your urine checked to make sure that the trace blood is no longer present. 





 





- Billing Disposition and Condition


Condition: GUARDED


Disposition: Home-Recommend to ED